# Patient Record
Sex: MALE | Race: BLACK OR AFRICAN AMERICAN | NOT HISPANIC OR LATINO | URBAN - METROPOLITAN AREA
[De-identification: names, ages, dates, MRNs, and addresses within clinical notes are randomized per-mention and may not be internally consistent; named-entity substitution may affect disease eponyms.]

---

## 2017-02-11 ENCOUNTER — EMERGENCY (EMERGENCY)
Facility: HOSPITAL | Age: 27
LOS: 0 days | Discharge: HOME | End: 2017-02-11

## 2017-06-27 DIAGNOSIS — R30.9 PAINFUL MICTURITION, UNSPECIFIED: ICD-10-CM

## 2017-06-27 DIAGNOSIS — I10 ESSENTIAL (PRIMARY) HYPERTENSION: ICD-10-CM

## 2017-06-27 DIAGNOSIS — R10.9 UNSPECIFIED ABDOMINAL PAIN: ICD-10-CM

## 2019-04-17 ENCOUNTER — EMERGENCY (EMERGENCY)
Facility: HOSPITAL | Age: 29
LOS: 0 days | Discharge: HOME | End: 2019-04-17
Attending: EMERGENCY MEDICINE | Admitting: EMERGENCY MEDICINE
Payer: MEDICAID

## 2019-04-17 VITALS
DIASTOLIC BLOOD PRESSURE: 86 MMHG | SYSTOLIC BLOOD PRESSURE: 139 MMHG | OXYGEN SATURATION: 98 % | HEART RATE: 84 BPM | TEMPERATURE: 97 F | RESPIRATION RATE: 18 BRPM

## 2019-04-17 VITALS — WEIGHT: 250 LBS | HEIGHT: 74 IN

## 2019-04-17 DIAGNOSIS — R05 COUGH: ICD-10-CM

## 2019-04-17 DIAGNOSIS — Z79.899 OTHER LONG TERM (CURRENT) DRUG THERAPY: ICD-10-CM

## 2019-04-17 DIAGNOSIS — J30.9 ALLERGIC RHINITIS, UNSPECIFIED: ICD-10-CM

## 2019-04-17 DIAGNOSIS — J30.2 OTHER SEASONAL ALLERGIC RHINITIS: ICD-10-CM

## 2019-04-17 PROCEDURE — 99283 EMERGENCY DEPT VISIT LOW MDM: CPT | Mod: 25

## 2019-04-17 RX ORDER — FEXOFENADINE HCL AND PSEUDOEPHEDRINE HCI 60; 120 MG/1; MG/1
1 TABLET, EXTENDED RELEASE ORAL
Qty: 30 | Refills: 0 | OUTPATIENT
Start: 2019-04-17 | End: 2019-05-16

## 2019-04-17 NOTE — ED PROVIDER NOTE - CLINICAL SUMMARY MEDICAL DECISION MAKING FREE TEXT BOX
Young male with 4 days of nasal congestion, eye tearing/irritation, feeling stuffed up and having cough. No fever or other systemic complaints. Pt has based on history and exam seasonal allergies with allergic rhinitis. +postnasal drip contributing to cough. Recommend allegra-d and follow up with PMD as needed. Return to ER for any worsening of symptoms.

## 2019-04-17 NOTE — ED PROVIDER NOTE - CARE PLAN
Principal Discharge DX:	Seasonal allergies  Secondary Diagnosis:	Allergic rhinitis, unspecified seasonality, unspecified trigger  Secondary Diagnosis:	Cough

## 2019-04-17 NOTE — ED PROVIDER NOTE - OBJECTIVE STATEMENT
28 y.o male w/ hx of seasonal allergies presents to the ED for evaluation of nasal congestion and cough x 4 days.  States that he has had dry cough and nasal congestion. Adds no other complaints at this time.  Cough non-productive.  Adds no other complaints at this time.  Denies chest pain, dyspnea, extremity pain, N/V/D, fever, chills, productive cough.

## 2019-04-17 NOTE — ED PROVIDER NOTE - NS ED ROS FT
CONST: No fever, chills or bodyaches  EYES: No pain, redness, drainage or visual changes.  ENT: + nasal congestion. No ear pain or discharge,  No sore throat  CARD: No chest pain, palpitations  RESP: + cough.  No SOB  GI: No abdominal pain, N/V/D  MS: No joint pain, back pain or extremity pain/injury  SKIN: No rashes  NEURO: No headache, dizziness, paresthesias

## 2019-04-17 NOTE — ED PROVIDER NOTE - ATTENDING CONTRIBUTION TO CARE
27 yo male with PMH of seasonal allergies presents to the ER for nasal congestion/rhinitis and cough x 4 days. Pt feels stuffed up , and states at times his eyes are itchy and nose is running. Cough is nonproductive but has had moments where he was coughing up yellowish phlegm. Pt has NO fever/chills/SOB/CP/back pain/leg pain/leg swelling/rash. Pt denies neck pain/HA/abdomen pain. Pt on exam NCAT, throat clear, TMs with mild fluid but no pus, +nasal congestions, Lungs CTAB, Heart reg s1s2, Abdomen soft nt/nd +BS, ext no edema on calf swelling pulses intact, Neuro intact. Recommend allergy meds with decongestant. If has fever, protracted cough/N/V/D/abdomen pain/CP return to er.

## 2019-04-17 NOTE — ED PROVIDER NOTE - NSFOLLOWUPCLINICS_GEN_ALL_ED_FT
[facility]  Medicine  [address], [place]   Phone: [phone]  Fax:   Follow Up Time: 1-3 Days

## 2019-04-17 NOTE — ED PROVIDER NOTE - PHYSICAL EXAMINATION
CONST: Well appearing in NAD  EYES: Sclera and conjunctiva clear.  ENT: + nasal congestion. Oropharynx normal appearing, no erythema or exudates. Uvula midline.  NECK: Non-tender, supple  CARD: Normal S1 S2; Normal rate and rhythm  RESP: Equal BS B/L, No wheezes, rhonchi or rales. No distress  SKIN: Warm, dry, no acute rashes. Good turgor

## 2019-04-17 NOTE — ED PROVIDER NOTE - NSFOLLOWUPINSTRUCTIONS_ED_ALL_ED_FT
Cough    Coughing is a reflex that clears your throat and your airways. Coughing helps to heal and protect your lungs. It is normal to cough occasionally, but a cough that happens with other symptoms or lasts a long time may be a sign of a condition that needs treatment. Coughing may be caused by infections, asthma or COPD, smoking, postnasal drip, gastroesophageal reflux, as well as other medical conditions. Take medicines only as instructed by your health care provider. Avoid anything that causes you to cough at work or at home including smoking.    SEEK IMMEDIATE MEDICAL CARE IF YOU HAVE THE FOLLOWING SYMPTOMS: coughing up blood, shortness of breath, rapid heart rate, chest pain, unexplained weight loss or night sweats.    *you also have seasonal allergies and allergic rhinitis.

## 2019-04-17 NOTE — ED ADULT NURSE NOTE - OBJECTIVE STATEMENT
patient c/o of productive cough with green sputum and congestion for 5x days. patient denies any n/v/d or chills. states he came to the ED to rule out pneumonia.

## 2021-08-12 NOTE — ED ADULT NURSE NOTE - NSIMPLEMENTINTERV_GEN_ALL_ED
No
Implemented All Universal Safety Interventions:  [place] to call system. Call bell, personal items and telephone within reach. Instruct patient to call for assistance. Room bathroom lighting operational. Non-slip footwear when patient is off stretcher. Physically safe environment: no spills, clutter or unnecessary equipment. Stretcher in lowest position, wheels locked, appropriate side rails in place.
[id number]